# Patient Record
Sex: MALE | Race: WHITE | NOT HISPANIC OR LATINO | Employment: FULL TIME | URBAN - METROPOLITAN AREA
[De-identification: names, ages, dates, MRNs, and addresses within clinical notes are randomized per-mention and may not be internally consistent; named-entity substitution may affect disease eponyms.]

---

## 2017-04-19 ENCOUNTER — HOSPITAL ENCOUNTER (EMERGENCY)
Facility: HOSPITAL | Age: 42
Discharge: HOME | End: 2017-04-19
Attending: EMERGENCY MEDICINE

## 2017-04-19 VITALS
DIASTOLIC BLOOD PRESSURE: 92 MMHG | HEIGHT: 68 IN | SYSTOLIC BLOOD PRESSURE: 145 MMHG | HEART RATE: 125 BPM | RESPIRATION RATE: 16 BRPM | WEIGHT: 270 LBS | OXYGEN SATURATION: 97 % | TEMPERATURE: 98 F | BODY MASS INDEX: 40.92 KG/M2

## 2017-04-19 DIAGNOSIS — L05.91 INFECTED PILONIDAL CYST: Primary | ICD-10-CM

## 2017-04-19 LAB — POCT GLUCOSE: 336 MG/DL (ref 70–110)

## 2017-04-19 PROCEDURE — 99283 EMERGENCY DEPT VISIT LOW MDM: CPT | Mod: 25

## 2017-04-19 PROCEDURE — 10081 I&D PILONIDAL CYST COMP: CPT

## 2017-04-19 PROCEDURE — 82962 GLUCOSE BLOOD TEST: CPT

## 2017-04-19 PROCEDURE — 25000003 PHARM REV CODE 250: Performed by: PHYSICIAN ASSISTANT

## 2017-04-19 RX ORDER — CLINDAMYCIN HYDROCHLORIDE 300 MG/1
300 CAPSULE ORAL EVERY 6 HOURS
Qty: 28 CAPSULE | Refills: 0 | Status: SHIPPED | OUTPATIENT
Start: 2017-04-19 | End: 2017-04-26

## 2017-04-19 RX ORDER — CLINDAMYCIN HYDROCHLORIDE 150 MG/1
300 CAPSULE ORAL
Status: COMPLETED | OUTPATIENT
Start: 2017-04-19 | End: 2017-04-19

## 2017-04-19 RX ORDER — LIDOCAINE HYDROCHLORIDE 10 MG/ML
10 INJECTION, SOLUTION EPIDURAL; INFILTRATION; INTRACAUDAL; PERINEURAL
Status: COMPLETED | OUTPATIENT
Start: 2017-04-19 | End: 2017-04-19

## 2017-04-19 RX ORDER — HYDROCODONE BITARTRATE AND ACETAMINOPHEN 5; 325 MG/1; MG/1
1 TABLET ORAL
Status: COMPLETED | OUTPATIENT
Start: 2017-04-19 | End: 2017-04-19

## 2017-04-19 RX ADMIN — CLINDAMYCIN HYDROCHLORIDE 300 MG: 150 CAPSULE ORAL at 07:04

## 2017-04-19 RX ADMIN — HYDROCODONE BITARTRATE AND ACETAMINOPHEN 1 TABLET: 5; 325 TABLET ORAL at 06:04

## 2017-04-19 RX ADMIN — LIDOCAINE HYDROCHLORIDE 100 MG: 10 INJECTION, SOLUTION EPIDURAL; INFILTRATION; INTRACAUDAL; PERINEURAL at 06:04

## 2017-04-19 NOTE — ED AVS SNAPSHOT
[facility] [address] [place] [address]               Rosalio Martínez   2017  6:15 PM   ED    Description:  Male : 1975   Department:  [facility]           Your Care was Coordinated By:     Provider Role From To    Clinton Jensen MD Attending Provider [phone] --    Sumi Santiago PA-C Physician Assistant 17 [phone] --      Reason for Visit     Abscess           Diagnoses this Visit        Comments    Infected pilonidal cyst    -  Primary       ED Disposition     ED Disposition Condition Comment    Discharge             To Do List           Follow-up Information     Please follow up.    Contact information:    See handout for list of primary care providers. You need to see someone to help control your diabetes.         Schedule an appointment as soon as possible for a visit with [facility].    Specialties:  Neurosurgery, Plastic Surgery, Podiatry, Surgical Oncology, Allergy, Cardiothoracic Surgery, Otolaryngology, Gastroenterology, Breast Surgery, Oral Surgery, Oral and Maxillofacial Surgery, Cardiology, Bariatrics, Internal Medicine, Family Medicine, Colon and Rectal Surgery, Dental General Practice, Gynecology, Orthopedic Surgery, Genetics, Endocrinology, Vascular Surgery, Physical Medicine and Rehabilitation, Urology, Neurology, Dermatology, Rheumatology    Contact information:    [address] [place] [id number]  [phone]          Schedule an appointment as soon as possible for a visit with [facility].    Contact information:    [address] [place] [id number] [phone]          Follow up with [facility].    Specialty:  Emergency Medicine    Why:  As needed    Contact information:    [address] [place] [id number] [phone]       These Medications        Disp Refills Start End    clindamycin  (CLEOCIN) 300 MG capsule 28 capsule 0 4/19/2017 4/26/2017    Take 1 capsule (300 mg total) by mouth every 6 (six) hours. - Oral    Pharmacy: [facility] [address] [place] [address] #: [phone]         [facility]     [facility] On Call Nurse Care Line - 24/7 Assistance  Unless otherwise directed by your provider, please contact [facility], our nurse care line that is available for 24/7 assistance.     Registered nurses in the [facility] provide: appointment scheduling, clinical advisement, health education, and other advisory services.  Call: [phone] (toll free)               Medications           Message regarding Medications     Verify the changes and/or additions to your medication regime listed below are the same as discussed with your clinician today.  If any of these changes or additions are incorrect, please notify your healthcare provider.        START taking these NEW medications        Refills    clindamycin (CLEOCIN) 300 MG capsule 0    Sig: Take 1 capsule (300 mg total) by mouth every 6 (six) hours.    Class: Print    Route: Oral      These medications were administered today        Dose Freq    lidocaine (PF) 10 mg/ml (1%) injection 100 mg 10 mL ED 1 Time    Sig: 10 mLs (100 mg total) by Infiltration route ED 1 Time.    Class: Normal    Route: Infiltration    hydrocodone-acetaminophen 5-325mg per tablet 1 tablet 1 tablet ED 1 Time    Sig: Take 1 tablet by mouth ED 1 Time.    Class: Normal    Route: Oral    clindamycin capsule 300 mg 300 mg ED 1 Time    Sig: Take 2 capsules (300 mg total) by mouth ED 1 Time.    Class: Normal    Route: Oral           Verify that the below list of medications is an accurate representation of the medications you are currently taking.  If none reported, the list may be blank. If incorrect, please contact your healthcare provider. Carry this list with you in case of emergency.           Current Medications     aspirin  "(ECOTRIN) 81 MG EC tablet Take 1 tablet (81 mg total) by mouth once daily.    clindamycin (CLEOCIN) 300 MG capsule Take 1 capsule (300 mg total) by mouth every 6 (six) hours.    clindamycin capsule 300 mg Take 2 capsules (300 mg total) by mouth ED 1 Time.    ibuprofen (ADVIL,MOTRIN) 800 MG tablet Take 1 tablet (800 mg total) by mouth 3 (three) times daily as needed.           Clinical Reference Information           Your Vitals Were     BP Pulse Temp Resp Height Weight    145/92 (BP Location: Right arm, Patient Position: Sitting) 125 98.1 °F (36.7 °C) (Oral) 16 5' 8" (1.727 m) 122.5 kg (270 lb)    SpO2 BMI             97% 41.05 kg/m2         Allergies as of 4/19/2017     No Known Allergies      Immunizations Administered on Date of Encounter - 4/19/2017     None      ED Micro, Lab, POCT     Start Ordered       Status Ordering Provider    04/19/17 1830 04/19/17 1830  POCT glucose  Once      Final result     04/19/17 1818 04/19/17 1817  POCT glucose  Once      Acknowledged       ED Imaging Orders     None        Discharge Instructions       Take antibiotics as prescribed.  Eat a diabetic diet and keep a blood sugar log. You need to see your primary care provider or go to a health clinic as soon as possible for management of your diabetes.  Keep area clean and dry.  You need to call and schedule an appointment with a general surgeon. You can call both [facility] and [facility] in [place] for follow up.  For worsening symptoms, chest pain, shortness of breath, increased abdominal pain, high grade fever, stroke or stroke like symptoms, immediately go to the nearest Emergency Room or call 911 as soon as possible.       Discharge References/Attachments     PILONIDAL CYST, INFECTED (INCISION AND DRAINAGE) (ENGLISH)      [facility] Sign-Up     Activating your [facility] account is as easy as 1-2-3!     1) Visit [email or web address], select Sign Up Now, enter this activation code and your date of birth, then " select Next.  [email or web address]  Expires: 6/3/2017  7:13 PM      2) Create a username and password to use when you visit [facility] in the future and select a security question in case you lose your password and select Next.    3) Enter your e-mail address and click Sign Up!    Additional Information  If you have questions, please e-mail [email or web address] or call [phone] to talk to our [facility] [facility] staff. Remember, [facility] is NOT to be used for urgent needs. For medical emergencies, dial 911.          [facility] complies with applicable Federal civil rights laws and does not discriminate on the basis of race, color, national origin, age, disability, or sex.        Language Assistance Services     ATTENTION: Language assistance services are available, free of charge. Please call [phone].      ATENCIÓN: Si habla español, tiene a ramos disposición servicios gratuitos de asistencia lingüística. Llame al [phone].     CHÚ Ý: N?u b?n nói Ti?ng Vi?t, có các d?ch v? h? tr? ngôn ng? mi?n phí dành cho b?n. G?i s? [phone].

## 2017-04-19 NOTE — ED NOTES
"Pt states that he has an abscess on his "butt crack" for the past 3 days that has been present for the past 3 days and he felt he might of had a fever today but is not sure because he did not take his temperature. Alert calm no other c/o. Undressed into gown. Aware to notify nurse of needs or concerns   "

## 2017-04-19 NOTE — ED PROVIDER NOTES
"Encounter Date: 4/19/2017       History     Chief Complaint   Patient presents with    Abscess     "couple on my tailbone"     Review of patient's allergies indicates:  No Known Allergies  HPI Comments: Patient is a 42 year old male with complaint of abscess to his tailbone for one week. He reports PMH significant for DM-2. He states he hasn't checked his sugar in awhile. It had previously been controlled by diet. He reports intermittent drainage from the wound. He has been taking hot showers and squeezing the area as well as soaking in the tub. He reports he may of had a fever today but he is unsure. He denied nausea, vomiting, chills or recent illness.     The history is provided by the patient.     Past Medical History:   Diagnosis Date    Diabetes mellitus     diet controlled    Gastric ulcer, chronic     GERD (gastroesophageal reflux disease) 2/27/2014     No past surgical history on file.  History reviewed. No pertinent family history.  Social History   Substance Use Topics    Smoking status: Never Smoker    Smokeless tobacco: None    Alcohol use No     Review of Systems   Constitutional: Positive for fever (Subjective). Negative for chills.   HENT: Negative for congestion and sore throat.    Respiratory: Negative for cough and shortness of breath.    Cardiovascular: Negative for chest pain.   Gastrointestinal: Negative for abdominal pain, nausea and vomiting.   Genitourinary: Negative for dysuria.   Musculoskeletal: Negative for back pain.   Skin: Negative for rash.   Neurological: Negative for dizziness, weakness and headaches.   Hematological: Does not bruise/bleed easily.       Physical Exam   Initial Vitals   BP Pulse Resp Temp SpO2   04/19/17 1553 04/19/17 1553 04/19/17 1553 04/19/17 1553 04/19/17 1553   145/92 125 16 98.1 °F (36.7 °C) 97 %     Physical Exam    Nursing note and vitals reviewed.  Constitutional: He appears well-developed and well-nourished.   HENT:   Head: Normocephalic and " atraumatic.   Right Ear: External ear normal.   Left Ear: External ear normal.   Nose: Nose normal.   Eyes: Conjunctivae are normal. Right eye exhibits no discharge. Left eye exhibits no discharge. No scleral icterus.   Neck: Normal range of motion. Neck supple.   Cardiovascular: Normal rate, regular rhythm and normal heart sounds. Exam reveals no gallop and no friction rub.    No murmur heard.  Pulmonary/Chest: Breath sounds normal. He has no wheezes. He has no rhonchi. He has no rales.   Abdominal: Soft. Bowel sounds are normal. He exhibits no distension. There is no tenderness.   Musculoskeletal: Normal range of motion. He exhibits no tenderness.   Neurological: He is oriented to person, place, and time.   Skin: Skin is warm and dry. Abscess noted. No rash noted. There is erythema.              ED Course   I & D - Incision and Drainage  Date/Time: 4/19/2017 7:49 PM  Performed by: AMT HANNA  Authorized by: MARIA MARIN   Type: pilonidal cyst  Body area: anogenital  Location details: pilonidal  Anesthesia: local infiltration    Anesthesia:  Anesthesia: local infiltration  Local Anesthetic: lidocaine 1% with epinephrine   Anesthetic total: 10 mL  Patient sedated: no  Scalpel size: 11  Incision type: single straight  Complexity: complex  Drainage: pus  Drainage amount: moderate  Wound treatment: wound packed,  incision,  drainage and  expression of material  Packing material: 1/4 in gauze  Complications: No  Specimens: No  Implants: No  Patient tolerance: Patient tolerated the procedure well with no immediate complications        Labs Reviewed   POCT GLUCOSE - Abnormal; Notable for the following:        Result Value    POCT Glucose 336 (*)     All other components within normal limits             Medical Decision Making:   History:   I obtained history from: someone other than patient.  Old Medical Records: I decided to obtain old medical records.       APC / Resident Notes:   This is an  emergent evaluation of a 42 year old male with complaint of infected pilonidal cyst. He reports subjective fever. I&D performed, see procedure note. Patient tolerated well. Patient was given instructions on wound care. Antibiotics given. Follow up with primary care provider and surgery. Discussed with patient the importance of follow up with general surgery. Return precautions given. All questions answered. Case was discussed with Dr. eJnsen who has evaluated the patient and is in agreement with the plan of care.                 ED Course     Clinical Impression:   The encounter diagnosis was Infected pilonidal cyst.          Sumi Santiago PA-C  04/20/17 2002

## 2017-04-20 NOTE — DISCHARGE INSTRUCTIONS
Take antibiotics as prescribed.  Eat a diabetic diet and keep a blood sugar log. You need to see your primary care provider or go to a health clinic as soon as possible for management of your diabetes.  Keep area clean and dry.  You need to call and schedule an appointment with a general surgeon. You can call both [facility] and [facility] in [place] for follow up.  For worsening symptoms, chest pain, shortness of breath, increased abdominal pain, high grade fever, stroke or stroke like symptoms, immediately go to the nearest Emergency Room or call 911 as soon as possible.

## 2017-04-20 NOTE — PROGRESS NOTES
CM received call from [facility] pharmacy regarding patient who is in store and cannot afford medication that was prescribed by Sumi Santiago NP.  SHIRLEY notified Sumi that patient is having these issues. She will call pharmacy. [facility] [phone]

## 2017-09-19 ENCOUNTER — HOSPITAL ENCOUNTER (EMERGENCY)
Facility: HOSPITAL | Age: 42
Discharge: HOME | End: 2017-09-19
Attending: EMERGENCY MEDICINE

## 2017-09-19 VITALS
WEIGHT: 260 LBS | DIASTOLIC BLOOD PRESSURE: 82 MMHG | HEIGHT: 68 IN | RESPIRATION RATE: 16 BRPM | TEMPERATURE: 98 F | OXYGEN SATURATION: 96 % | SYSTOLIC BLOOD PRESSURE: 138 MMHG | HEART RATE: 111 BPM | BODY MASS INDEX: 39.4 KG/M2

## 2017-09-19 DIAGNOSIS — L03.119 CELLULITIS AND ABSCESS OF LEG: Primary | ICD-10-CM

## 2017-09-19 DIAGNOSIS — L02.419 CELLULITIS AND ABSCESS OF LEG: Primary | ICD-10-CM

## 2017-09-19 LAB — POCT GLUCOSE: 298 MG/DL (ref 70–110)

## 2017-09-19 PROCEDURE — 10060 I&D ABSCESS SIMPLE/SINGLE: CPT | Mod: RT

## 2017-09-19 PROCEDURE — 82962 GLUCOSE BLOOD TEST: CPT

## 2017-09-19 PROCEDURE — 99283 EMERGENCY DEPT VISIT LOW MDM: CPT | Mod: 25

## 2017-09-19 RX ORDER — DICLOFENAC SODIUM 50 MG/1
50 TABLET, DELAYED RELEASE ORAL 3 TIMES DAILY
Qty: 15 TABLET | Refills: 0 | Status: SHIPPED | OUTPATIENT
Start: 2017-09-19 | End: 2017-12-24

## 2017-09-19 RX ORDER — SULFAMETHOXAZOLE AND TRIMETHOPRIM 800; 160 MG/1; MG/1
1 TABLET ORAL 2 TIMES DAILY
Qty: 14 TABLET | Refills: 0 | Status: SHIPPED | OUTPATIENT
Start: 2017-09-19 | End: 2017-09-26

## 2017-09-19 RX ORDER — LIDOCAINE HCL/EPINEPHRINE/PF 2%-1:200K
10 VIAL (ML) INJECTION ONCE
Status: DISCONTINUED | OUTPATIENT
Start: 2017-09-19 | End: 2017-09-19 | Stop reason: HOSPADM

## 2017-09-19 NOTE — ED PROVIDER NOTES
Encounter Date: 9/19/2017       History     Chief Complaint   Patient presents with    Abscess     rt. upper thigh x 4 days      Chief complaint: Right thigh infection    History of present illness:Grant Leung is a 70 y.o. male who presents with  pain and swelling with associated redness to the right thigh that began 4 days ago.  He has a history of recurrent cellulitis and type 2 diabetes.  He denies any fever nausea or vomiting.            Review of patient's allergies indicates:  No Known Allergies  Past Medical History:   Diagnosis Date    Diabetes mellitus     diet controlled    Gastric ulcer, chronic     GERD (gastroesophageal reflux disease) 2/27/2014     History reviewed. No pertinent surgical history.  History reviewed. No pertinent family history.  Social History   Substance Use Topics    Smoking status: Never Smoker    Smokeless tobacco: Not on file    Alcohol use No     Review of Systems   Constitutional: Negative for activity change, appetite change, chills, fatigue and fever.   Eyes: Negative for visual disturbance.   Respiratory: Negative for apnea and shortness of breath.    Cardiovascular: Negative for chest pain and palpitations.   Gastrointestinal: Negative for abdominal distention and abdominal pain.   Genitourinary: Negative for difficulty urinating.   Musculoskeletal: Negative for neck pain.   Skin: Positive for color change and rash. Negative for pallor.   Neurological: Negative for headaches.   Hematological: Does not bruise/bleed easily.   Psychiatric/Behavioral: Negative for agitation.       Physical Exam     Initial Vitals [09/19/17 0755]   BP Pulse Resp Temp SpO2   138/82 (!) 111 16 98 °F (36.7 °C) 96 %      MAP       100.67         Physical Exam    Constitutional: Vital signs are normal. He is not diaphoretic.  Non-toxic appearance. He does not have a sickly appearance. No distress.   HENT:   Head: Normocephalic and atraumatic.   Eyes: Conjunctivae are normal.   Neck:  Phonation normal. Neck supple. No thyromegaly present. No tracheal deviation present.   Cardiovascular: Normal rate.   Abdominal: Normal appearance. He exhibits no distension.   Neurological: He is alert and oriented to person, place, and time.   Skin: Skin is warm, dry and intact. There is erythema (0 cm area with a 2 cm area of fluctuance with ). No pallor.   Psychiatric: He has a normal mood and affect. His speech is normal and behavior is normal. Thought content normal.         ED Course   I & D - Incision and Drainage  Date/Time: 9/19/2017 11:25 AM  Performed by: KATALINA BAZZI III  Authorized by: KATALINA BAZZI III   Type: abscess  Body area: lower extremity  Location details: right leg  Anesthesia: local infiltration    Anesthesia:  Local Anesthetic: lidocaine 2% with epinephrine  Anesthetic total: 2 mL  Scalpel size: 11  Incision type: single straight  Complexity: simple  Drainage: pus  Drainage amount: moderate  Wound treatment: incision  Patient tolerance: Patient tolerated the procedure well with no immediate complications        Labs Reviewed   POCT GLUCOSE MONITORING CONTINUOUS             Medical Decision Making:   ED Management:  Rosalio Martínez is a 42 y.o. male who presents with  an erythematous right thigh.  Abscess is incised and drained.  There is overlying cellulitis therefore he will be treated with oral Bactrim.  There is no evidence of systemic infection.                   ED Course      Clinical Impression:   The encounter diagnosis was Cellulitis and abscess of leg.                           Katalina Bazzi III, MD  09/19/17 1126

## 2017-12-24 ENCOUNTER — HOSPITAL ENCOUNTER (EMERGENCY)
Facility: HOSPITAL | Age: 42
Discharge: HOME | End: 2017-12-24
Attending: EMERGENCY MEDICINE

## 2017-12-24 VITALS
RESPIRATION RATE: 16 BRPM | OXYGEN SATURATION: 94 % | HEIGHT: 68 IN | DIASTOLIC BLOOD PRESSURE: 65 MMHG | HEART RATE: 100 BPM | WEIGHT: 270 LBS | BODY MASS INDEX: 40.92 KG/M2 | TEMPERATURE: 100 F | SYSTOLIC BLOOD PRESSURE: 113 MMHG

## 2017-12-24 DIAGNOSIS — L03.115 CELLULITIS OF RIGHT THIGH: Primary | ICD-10-CM

## 2017-12-24 DIAGNOSIS — R73.9 HYPERGLYCEMIA WITHOUT KETOSIS: ICD-10-CM

## 2017-12-24 LAB
ANION GAP SERPL CALC-SCNC: 10 MMOL/L
BASOPHILS # BLD AUTO: 0 K/UL
BASOPHILS NFR BLD: 0.2 %
BUN SERPL-MCNC: 15 MG/DL
CALCIUM SERPL-MCNC: 9.5 MG/DL
CHLORIDE SERPL-SCNC: 98 MMOL/L
CO2 SERPL-SCNC: 25 MMOL/L
CREAT SERPL-MCNC: 1.3 MG/DL
DIFFERENTIAL METHOD: ABNORMAL
EOSINOPHIL # BLD AUTO: 0.1 K/UL
EOSINOPHIL NFR BLD: 0.7 %
ERYTHROCYTE [DISTWIDTH] IN BLOOD BY AUTOMATED COUNT: 12.6 %
EST. GFR  (AFRICAN AMERICAN): >60 ML/MIN/1.73 M^2
EST. GFR  (NON AFRICAN AMERICAN): >60 ML/MIN/1.73 M^2
GLUCOSE SERPL-MCNC: 475 MG/DL
HCT VFR BLD AUTO: 43.7 %
HGB BLD-MCNC: 15 G/DL
LACTATE SERPL-SCNC: 1.5 MMOL/L
LYMPHOCYTES # BLD AUTO: 1.5 K/UL
LYMPHOCYTES NFR BLD: 13.3 %
MCH RBC QN AUTO: 30.3 PG
MCHC RBC AUTO-ENTMCNC: 34.4 G/DL
MCV RBC AUTO: 88 FL
MONOCYTES # BLD AUTO: 0.7 K/UL
MONOCYTES NFR BLD: 6.2 %
NEUTROPHILS # BLD AUTO: 9.2 K/UL
NEUTROPHILS NFR BLD: 79.6 %
PLATELET # BLD AUTO: 179 K/UL
PMV BLD AUTO: 9.6 FL
POTASSIUM SERPL-SCNC: 4.1 MMOL/L
RBC # BLD AUTO: 4.96 M/UL
SODIUM SERPL-SCNC: 133 MMOL/L
WBC # BLD AUTO: 11.6 K/UL

## 2017-12-24 PROCEDURE — 85025 COMPLETE CBC W/AUTO DIFF WBC: CPT

## 2017-12-24 PROCEDURE — 99284 EMERGENCY DEPT VISIT MOD MDM: CPT | Mod: 25

## 2017-12-24 PROCEDURE — 80048 BASIC METABOLIC PNL TOTAL CA: CPT

## 2017-12-24 PROCEDURE — 25000003 PHARM REV CODE 250: Performed by: EMERGENCY MEDICINE

## 2017-12-24 PROCEDURE — 96366 THER/PROPH/DIAG IV INF ADDON: CPT

## 2017-12-24 PROCEDURE — 63600175 PHARM REV CODE 636 W HCPCS: Performed by: EMERGENCY MEDICINE

## 2017-12-24 PROCEDURE — 36415 COLL VENOUS BLD VENIPUNCTURE: CPT

## 2017-12-24 PROCEDURE — 82962 GLUCOSE BLOOD TEST: CPT

## 2017-12-24 PROCEDURE — 96365 THER/PROPH/DIAG IV INF INIT: CPT

## 2017-12-24 PROCEDURE — 83605 ASSAY OF LACTIC ACID: CPT

## 2017-12-24 RX ORDER — SULFAMETHOXAZOLE AND TRIMETHOPRIM 800; 160 MG/1; MG/1
2 TABLET ORAL 2 TIMES DAILY
Qty: 28 TABLET | Refills: 0 | Status: SHIPPED | OUTPATIENT
Start: 2017-12-24 | End: 2017-12-31

## 2017-12-24 RX ORDER — NAPROXEN 250 MG/1
500 TABLET ORAL
Status: COMPLETED | OUTPATIENT
Start: 2017-12-24 | End: 2017-12-24

## 2017-12-24 RX ORDER — NAPROXEN 500 MG/1
500 TABLET ORAL 2 TIMES DAILY WITH MEALS
Qty: 30 TABLET | Refills: 0 | Status: SHIPPED | OUTPATIENT
Start: 2017-12-24 | End: 2018-01-08

## 2017-12-24 RX ORDER — CEPHALEXIN 500 MG/1
500 CAPSULE ORAL 4 TIMES DAILY
Qty: 20 CAPSULE | Refills: 0 | Status: SHIPPED | OUTPATIENT
Start: 2017-12-24 | End: 2017-12-29

## 2017-12-24 RX ADMIN — VANCOMYCIN HYDROCHLORIDE 1000 MG: 1 INJECTION, POWDER, LYOPHILIZED, FOR SOLUTION INTRAVENOUS at 08:12

## 2017-12-24 RX ADMIN — NAPROXEN 500 MG: 250 TABLET ORAL at 08:12

## 2017-12-25 NOTE — ED PROVIDER NOTES
"Encounter Date: 12/24/2017    SCRIBE #1 NOTE: I, Ingrid Carlton, am scribing for, and in the presence of, Dr. Andrews.       History     Chief Complaint   Patient presents with    Cellulitis     Rt upper thigh x 2 weeks       12/24/2017 7:28 PM     Chief complaint: Cellulitis      Rosalio Martínez is a 42 y.o. male with poorly DM2 and prior history of cellulitis who presents to the ED with an onset of acute area of erythema, tenderness, and warmth to the right thigh with associated discharge and low-grade fever. The patient reports a sore developed from a pocket knife was in his pocket and rubbing against his thigh 2 weeks ago but worsened a few days ago. He states that he has not checked his BS "in a while" or taken any BG medications within a similar time frame, because he was managing his BG with diet.  He and his wife reports they were eating pizza at a [employer] function prior to arrival. The patient denies history of CKD or any other symptoms at this time. No pertinent SHx noted.      The history is provided by the patient.     Review of patient's allergies indicates:  No Known Allergies  Past Medical History:   Diagnosis Date    Diabetes mellitus     diet controlled    Gastric ulcer, chronic     GERD (gastroesophageal reflux disease) 2/27/2014     History reviewed. No pertinent surgical history.  History reviewed. No pertinent family history.  Social History   Substance Use Topics    Smoking status: Never Smoker    Smokeless tobacco: Never Used    Alcohol use No     Review of Systems   Constitutional: Positive for fever (low-grade).   HENT: Negative for congestion.    Eyes: Negative for visual disturbance.   Respiratory: Negative for wheezing.    Cardiovascular: Negative for chest pain.   Gastrointestinal: Negative for abdominal pain.   Genitourinary: Negative for dysuria.   Musculoskeletal: Negative for joint swelling.   Skin: Positive for color change (erythema) and rash.   Neurological: Negative for " syncope.   Hematological: Does not bruise/bleed easily.   Psychiatric/Behavioral: Negative for confusion.     Physical Exam     Initial Vitals [12/24/17 1921]   BP Pulse Resp Temp SpO2   125/73 (!) 111 16 100.2 °F (37.9 °C) 95 %      MAP       90.33         Physical Exam    Nursing note and vitals reviewed.  Constitutional: He appears well-nourished.   HENT:   Head: Normocephalic and atraumatic.   Eyes: Conjunctivae and EOM are normal.   Neck: Normal range of motion. Neck supple. No thyroid mass present.   Cardiovascular: Normal rate, regular rhythm and normal heart sounds. Exam reveals no gallop and no friction rub.    No murmur heard.  Pulmonary/Chest: Breath sounds normal. He has no wheezes. He has no rhonchi. He has no rales.   Abdominal: Soft. Normal appearance and bowel sounds are normal. There is no tenderness.   Neurological: He is alert and oriented to person, place, and time. He has normal strength. No cranial nerve deficit or sensory deficit.   Skin: Skin is warm and dry. Rash noted. There is erythema.   9 cm x 5 cm area of indurated erythema to the proximal right lateral thigh with scant clear drainage.    Psychiatric: He has a normal mood and affect. His speech is normal. Cognition and memory are normal.       ED Course   Procedures  Labs Reviewed   CBC W/ AUTO DIFFERENTIAL - Abnormal; Notable for the following:        Result Value    Gran # 9.2 (*)     Gran% 79.6 (*)     Lymph% 13.3 (*)     All other components within normal limits   BASIC METABOLIC PANEL - Abnormal; Notable for the following:     Sodium 133 (*)     Glucose 475 (*)     All other components within normal limits    Narrative:     glucose   critical result(s) called and verbal readback obtained from   danyell whitney, 12/24/2017 20:40   LACTIC ACID, PLASMA   POCT GLUCOSE MONITORING CONTINUOUS           Medical Decision Making:   History:   Old Medical Records: I decided to obtain old medical records.  Initial Assessment:   This patient was  interviewed and examined and does not appear acutely toxic.  He has borderline fever and tachycardia.  He has a small area of erythema consistent with training cellulitis but no focal abscess to drain at this time.  IV was established and he was provided a dose of IV vancomycin.  He'll be observed in the emergency department and screening labs will be obtained assess for evidence of severe infection, including WBC elevation or lactic acid elevation.  Clinical Tests:   Lab Tests: Ordered and Reviewed  ED Management:  Labs are noted to be largely unremarkable and the patient progressed with stable pressures (with the exception of hyperglycemia at a value of 475 with a closed anion gap).  They do endorse eating pizza prior to arrival and he strongly urged to begin checking his blood sugar and follow-up with his doctor regarding resumption of his oral antihyperglycemic's.  He has had resolution of similar areas of cellulitis in the past with only Bactrim and will be provided dosing for this.  I will add oral Keflex for greater strep coverage.  He is asked to follow-up with his primary care doctor as soon as possible regarding improvement for this.  Patient was asked to return to the emergency Department for any new, concerning, or worsening symptoms, including worsening rash or fever.  The patient was agreeable with this plan for follow-up and he was discharged in stable condition.            Scribe Attestation:   Scribe #1: I performed the above scribed service and the documentation accurately describes the services I performed. I attest to the accuracy of the note.    I, Dr. Adarsh Andrews, personally performed the services described in this documentation. All medical record entries made by the scribe were at my direction and in my presence.  I have reviewed the chart and agree that the record reflects my personal performance and is accurate and complete. Adarsh Andrews MD.  4:08 AM 12/25/2017          ED Course       Clinical Impression:     1. Cellulitis of right thigh    2. Hyperglycemia without ketosis          Disposition:   Disposition: Discharged  Condition: Stable                        Adarsh Andrews MD  12/25/17 7203

## 2017-12-25 NOTE — ED NOTES
Patient identifiers for Rosalio Martínez checked and correct.  LOC:  Patient is awake, alert, and aware of environment with an appropriate affect. Patient is oriented x 3 and speaking appropriately.  APPEARANCE:  Patient resting comfortably and in no acute distress. Patient is clean and well groomed, patient's clothing is properly fastened.  SKIN:  The skin is warm and dry. Patient has normal skin turgor and moist mucus membranes. Redness and swelling with drainage reported to right upper outer thigh.   MUSCULOSKELETAL:  Patient is moving all extremities well, no obvious deformities noted. Pulses intact.   RESPIRATORY:  Airway is open and patent. Respirations are spontaneous and non-labored with normal effort and rate.  CARDIAC:  Patient has tachycardic rate. . No peripheral edema noted. Capillary refill < 3 seconds.  ABDOMEN:  No distention noted.  Soft and non-tender upon palpation.  NEUROLOGICAL:  PERRL. Facial expression is symmetrical. Hand grasps are equal bilaterally. Normal sensation in all extremities when touched with finger.  Allergies reported:  Review of patient's allergies indicates:  No Known Allergies  OTHER NOTES:

## 2017-12-28 LAB — POCT GLUCOSE: 376 MG/DL (ref 70–110)

## 2018-01-17 ENCOUNTER — HOSPITAL ENCOUNTER (EMERGENCY)
Facility: HOSPITAL | Age: 43
Discharge: HOME | End: 2018-01-17
Attending: EMERGENCY MEDICINE

## 2018-01-17 VITALS
BODY MASS INDEX: 40.92 KG/M2 | HEART RATE: 94 BPM | TEMPERATURE: 98 F | SYSTOLIC BLOOD PRESSURE: 119 MMHG | HEIGHT: 68 IN | WEIGHT: 270 LBS | RESPIRATION RATE: 16 BRPM | OXYGEN SATURATION: 97 % | DIASTOLIC BLOOD PRESSURE: 66 MMHG

## 2018-01-17 DIAGNOSIS — L24.9 IRRITANT CONTACT DERMATITIS, UNSPECIFIED TRIGGER: Primary | ICD-10-CM

## 2018-01-17 PROCEDURE — 99283 EMERGENCY DEPT VISIT LOW MDM: CPT

## 2018-01-17 RX ORDER — DOXYCYCLINE 100 MG/1
100 CAPSULE ORAL 2 TIMES DAILY
Qty: 20 CAPSULE | Refills: 0 | Status: SHIPPED | OUTPATIENT
Start: 2018-01-17 | End: 2018-01-27

## 2018-01-17 RX ORDER — HYDROCORTISONE 25 MG/G
CREAM TOPICAL 2 TIMES DAILY
Qty: 1 TUBE | Refills: 0 | Status: SHIPPED | OUTPATIENT
Start: 2018-01-17 | End: 2018-01-27

## 2018-01-17 NOTE — ED PROVIDER NOTES
"Encounter Date: 1/17/2018       History     Chief Complaint   Patient presents with    Rash     bilateral legs        01/17/2018 5:37 PM     Chief complaint: Rash      Rosalio Martínez is a 42 y.o. male with DM and GERD who presents to the ED with an onset of itchy, erythematous rash to bilateral calves. He reports that he was "messing" with firewood a week ago before the rash began. The patient states that he does not check his BS as "often as he should." He denies fevers or any other symptoms at this time. No SHx noted.       The history is provided by the patient.     Review of patient's allergies indicates:  No Known Allergies  Past Medical History:   Diagnosis Date    Diabetes mellitus     diet controlled    Gastric ulcer, chronic     GERD (gastroesophageal reflux disease) 2/27/2014     History reviewed. No pertinent surgical history.  History reviewed. No pertinent family history.  Social History   Substance Use Topics    Smoking status: Never Smoker    Smokeless tobacco: Never Used    Alcohol use No     Review of Systems   Constitutional: Negative for chills and fever.   HENT: Negative for nosebleeds.    Eyes: Negative for visual disturbance.   Respiratory: Negative for cough and shortness of breath.    Cardiovascular: Negative for chest pain and palpitations.   Gastrointestinal: Negative for abdominal pain, diarrhea, nausea and vomiting.   Genitourinary: Negative for dysuria and hematuria.   Musculoskeletal: Negative for back pain and neck pain.   Skin: Positive for color change (erythema) and rash (itchy).   Neurological: Negative for seizures, syncope and headaches.     Physical Exam     Initial Vitals [01/17/18 1553]   BP Pulse Resp Temp SpO2   119/66 94 16 97.6 °F (36.4 °C) 97 %      MAP       83.67         Physical Exam    Nursing note and vitals reviewed.  Constitutional: He appears well-developed and well-nourished. No distress.   HENT:   Head: Normocephalic and atraumatic.   Eyes: Conjunctivae " and EOM are normal. Pupils are equal, round, and reactive to light.   Neck: Neck supple.   Cardiovascular: Normal rate, regular rhythm and normal heart sounds. Exam reveals no gallop and no friction rub.    No murmur heard.  Pulmonary/Chest: Breath sounds normal. No respiratory distress. He has no wheezes. He has no rhonchi. He has no rales.   Musculoskeletal: Normal range of motion. He exhibits no edema or tenderness.   Neurological: He is alert and oriented to person, place, and time.   Skin: Skin is warm and dry. There is erythema.   Raised area of erythematous, non-blanched cluster of blanching, some linear in nature, to bilateral calf. Negative Homans' sign. No warmth or tenderness over deep venous symptoms.    Psychiatric: He has a normal mood and affect.       ED Course   Procedures  Labs Reviewed - No data to display        Medical Decision Making:   History:   Old Medical Records: I decided to obtain old medical records.            Scribe Attestation:   Scribe #1: I performed the above scribed service and the documentation accurately describes the services I performed. I attest to the accuracy of the note.            ED Course      Clinical Impression:     1. Irritant contact dermatitis, unspecified trigger          Disposition:   Disposition: Discharged  Condition: Stable                        Akshat Almendarez MD  01/17/18 [id number]

## 2020-07-24 ENCOUNTER — OCCUPATIONAL HEALTH (OUTPATIENT)
Dept: URGENT CARE | Facility: CLINIC | Age: 45
End: 2020-07-24

## 2020-07-24 PROCEDURE — 99499 UNLISTED E&M SERVICE: CPT | Mod: S$GLB,,, | Performed by: NURSE PRACTITIONER

## 2020-07-24 PROCEDURE — 99499 PHYSICAL - BASIC COMPLEXITY: ICD-10-PCS | Mod: S$GLB,,, | Performed by: NURSE PRACTITIONER

## 2021-07-16 ENCOUNTER — HOSPITAL ENCOUNTER (EMERGENCY)
Facility: HOSPITAL | Age: 46
Discharge: HOME | End: 2021-07-16
Attending: EMERGENCY MEDICINE
Payer: COMMERCIAL

## 2021-07-16 VITALS
HEIGHT: 69 IN | SYSTOLIC BLOOD PRESSURE: 135 MMHG | BODY MASS INDEX: 38.09 KG/M2 | TEMPERATURE: 99 F | OXYGEN SATURATION: 96 % | WEIGHT: 257.19 LBS | HEART RATE: 79 BPM | RESPIRATION RATE: 19 BRPM | DIASTOLIC BLOOD PRESSURE: 76 MMHG

## 2021-07-16 DIAGNOSIS — R07.9 CHEST PAIN: Primary | ICD-10-CM

## 2021-07-16 LAB
ALBUMIN SERPL BCP-MCNC: 3.8 G/DL (ref 3.5–5.2)
ALP SERPL-CCNC: 78 U/L (ref 55–135)
ALT SERPL W/O P-5'-P-CCNC: 21 U/L (ref 10–44)
ANION GAP SERPL CALC-SCNC: 13 MMOL/L (ref 8–16)
AST SERPL-CCNC: 24 U/L (ref 10–40)
BACTERIA #/AREA URNS HPF: NORMAL /HPF
BASOPHILS # BLD AUTO: 0.06 K/UL (ref 0–0.2)
BASOPHILS NFR BLD: 0.9 % (ref 0–1.9)
BILIRUB SERPL-MCNC: 0.8 MG/DL (ref 0.1–1)
BILIRUB UR QL STRIP: NEGATIVE
BNP SERPL-MCNC: <10 PG/ML (ref 0–99)
BUN SERPL-MCNC: 18 MG/DL (ref 6–20)
CALCIUM SERPL-MCNC: 8.9 MG/DL (ref 8.7–10.5)
CHLORIDE SERPL-SCNC: 104 MMOL/L (ref 95–110)
CK SERPL-CCNC: 391 U/L (ref 20–200)
CLARITY UR: CLEAR
CO2 SERPL-SCNC: 22 MMOL/L (ref 23–29)
COLOR UR: YELLOW
CREAT SERPL-MCNC: 1.2 MG/DL (ref 0.5–1.4)
CTP QC/QA: YES
DIFFERENTIAL METHOD: NORMAL
EOSINOPHIL # BLD AUTO: 0.4 K/UL (ref 0–0.5)
EOSINOPHIL NFR BLD: 5.4 % (ref 0–8)
ERYTHROCYTE [DISTWIDTH] IN BLOOD BY AUTOMATED COUNT: 12.1 % (ref 11.5–14.5)
EST. GFR  (AFRICAN AMERICAN): >60 ML/MIN/1.73 M^2
EST. GFR  (NON AFRICAN AMERICAN): >60 ML/MIN/1.73 M^2
GLUCOSE SERPL-MCNC: 276 MG/DL (ref 70–110)
GLUCOSE UR QL STRIP: ABNORMAL
HCT VFR BLD AUTO: 41.5 % (ref 40–54)
HCV AB SERPL QL IA: NEGATIVE
HGB BLD-MCNC: 14.2 G/DL (ref 14–18)
HGB UR QL STRIP: NEGATIVE
HIV 1+2 AB+HIV1 P24 AG SERPL QL IA: NEGATIVE
IMM GRANULOCYTES # BLD AUTO: 0.02 K/UL (ref 0–0.04)
IMM GRANULOCYTES NFR BLD AUTO: 0.3 % (ref 0–0.5)
KETONES UR QL STRIP: NEGATIVE
LEUKOCYTE ESTERASE UR QL STRIP: NEGATIVE
LYMPHOCYTES # BLD AUTO: 1.7 K/UL (ref 1–4.8)
LYMPHOCYTES NFR BLD: 24.8 % (ref 18–48)
MCH RBC QN AUTO: 30.2 PG (ref 27–31)
MCHC RBC AUTO-ENTMCNC: 34.2 G/DL (ref 32–36)
MCV RBC AUTO: 88 FL (ref 82–98)
MICROSCOPIC COMMENT: NORMAL
MONOCYTES # BLD AUTO: 0.6 K/UL (ref 0.3–1)
MONOCYTES NFR BLD: 8 % (ref 4–15)
NEUTROPHILS # BLD AUTO: 4.3 K/UL (ref 1.8–7.7)
NEUTROPHILS NFR BLD: 60.6 % (ref 38–73)
NITRITE UR QL STRIP: NEGATIVE
NRBC BLD-RTO: 0 /100 WBC
PH UR STRIP: 6 [PH] (ref 5–8)
PLATELET # BLD AUTO: 171 K/UL (ref 150–450)
PMV BLD AUTO: 11.6 FL (ref 9.2–12.9)
POTASSIUM SERPL-SCNC: 3.9 MMOL/L (ref 3.5–5.1)
PROT SERPL-MCNC: 7.3 G/DL (ref 6–8.4)
PROT UR QL STRIP: NEGATIVE
RBC # BLD AUTO: 4.7 M/UL (ref 4.6–6.2)
SARS-COV-2 RDRP RESP QL NAA+PROBE: NEGATIVE
SODIUM SERPL-SCNC: 139 MMOL/L (ref 136–145)
SP GR UR STRIP: >=1.03 (ref 1–1.03)
TROPONIN I SERPL DL<=0.01 NG/ML-MCNC: <0.006 NG/ML (ref 0–0.03)
TROPONIN I SERPL DL<=0.01 NG/ML-MCNC: <0.006 NG/ML (ref 0–0.03)
URN SPEC COLLECT METH UR: ABNORMAL
UROBILINOGEN UR STRIP-ACNC: NEGATIVE EU/DL
WBC # BLD AUTO: 7.01 K/UL (ref 3.9–12.7)
YEAST URNS QL MICRO: NORMAL

## 2021-07-16 PROCEDURE — 87389 HIV-1 AG W/HIV-1&-2 AB AG IA: CPT | Performed by: EMERGENCY MEDICINE

## 2021-07-16 PROCEDURE — 85025 COMPLETE CBC W/AUTO DIFF WBC: CPT | Performed by: EMERGENCY MEDICINE

## 2021-07-16 PROCEDURE — U0002 COVID-19 LAB TEST NON-CDC: HCPCS | Performed by: EMERGENCY MEDICINE

## 2021-07-16 PROCEDURE — 82550 ASSAY OF CK (CPK): CPT | Performed by: EMERGENCY MEDICINE

## 2021-07-16 PROCEDURE — 83880 ASSAY OF NATRIURETIC PEPTIDE: CPT | Performed by: EMERGENCY MEDICINE

## 2021-07-16 PROCEDURE — 93005 ELECTROCARDIOGRAM TRACING: CPT

## 2021-07-16 PROCEDURE — 93010 ELECTROCARDIOGRAM REPORT: CPT | Mod: ,,, | Performed by: INTERNAL MEDICINE

## 2021-07-16 PROCEDURE — 81000 URINALYSIS NONAUTO W/SCOPE: CPT | Performed by: EMERGENCY MEDICINE

## 2021-07-16 PROCEDURE — 84484 ASSAY OF TROPONIN QUANT: CPT | Mod: 91 | Performed by: EMERGENCY MEDICINE

## 2021-07-16 PROCEDURE — 93010 EKG 12-LEAD: ICD-10-PCS | Mod: ,,, | Performed by: INTERNAL MEDICINE

## 2021-07-16 PROCEDURE — 80053 COMPREHEN METABOLIC PANEL: CPT | Performed by: EMERGENCY MEDICINE

## 2021-07-16 PROCEDURE — 84484 ASSAY OF TROPONIN QUANT: CPT | Performed by: EMERGENCY MEDICINE

## 2021-07-16 PROCEDURE — 86803 HEPATITIS C AB TEST: CPT | Performed by: EMERGENCY MEDICINE

## 2021-07-16 PROCEDURE — 99285 EMERGENCY DEPT VISIT HI MDM: CPT
